# Patient Record
Sex: FEMALE | Race: WHITE | URBAN - METROPOLITAN AREA
[De-identification: names, ages, dates, MRNs, and addresses within clinical notes are randomized per-mention and may not be internally consistent; named-entity substitution may affect disease eponyms.]

---

## 2023-09-17 ENCOUNTER — WEB ENCOUNTER (OUTPATIENT)
Dept: URBAN - METROPOLITAN AREA CLINIC 23 | Facility: CLINIC | Age: 58
End: 2023-09-17

## 2023-09-18 ENCOUNTER — OFFICE VISIT (OUTPATIENT)
Dept: URBAN - METROPOLITAN AREA CLINIC 23 | Facility: CLINIC | Age: 58
End: 2023-09-18
Payer: COMMERCIAL

## 2023-09-18 ENCOUNTER — DASHBOARD ENCOUNTERS (OUTPATIENT)
Age: 58
End: 2023-09-18

## 2023-09-18 ENCOUNTER — WEB ENCOUNTER (OUTPATIENT)
Dept: URBAN - METROPOLITAN AREA CLINIC 23 | Facility: CLINIC | Age: 58
End: 2023-09-18

## 2023-09-18 VITALS
HEART RATE: 77 BPM | SYSTOLIC BLOOD PRESSURE: 177 MMHG | BODY MASS INDEX: 34.16 KG/M2 | HEIGHT: 65 IN | DIASTOLIC BLOOD PRESSURE: 92 MMHG | WEIGHT: 205 LBS | TEMPERATURE: 98.1 F

## 2023-09-18 DIAGNOSIS — E66.9 OBESITY (BMI 30.0-34.9): ICD-10-CM

## 2023-09-18 DIAGNOSIS — R11.0 NAUSEA: ICD-10-CM

## 2023-09-18 DIAGNOSIS — R10.11 RUQ ABDOMINAL PAIN: ICD-10-CM

## 2023-09-18 DIAGNOSIS — R10.10 UPPER ABDOMINAL PAIN: ICD-10-CM

## 2023-09-18 PROCEDURE — 99204 OFFICE O/P NEW MOD 45 MIN: CPT | Performed by: INTERNAL MEDICINE

## 2023-09-18 PROCEDURE — 99244 OFF/OP CNSLTJ NEW/EST MOD 40: CPT | Performed by: INTERNAL MEDICINE

## 2023-09-18 RX ORDER — LEVOTHYROXINE SODIUM 100 UG/1
TABLET ORAL
Qty: 0 | Refills: 0 | Status: ACTIVE | COMMUNITY
Start: 1900-01-01

## 2023-09-18 NOTE — HPI-TODAY'S VISIT:
- 59 yo  female, referred by Dr. Kathie Merino for further evaluation of GI complaints as detailed below.  A copy of this note will be sent to the referring physician. - For the past 1 year she has been experiencing intermittent RUQ abdominal pain.  These symptoms are getting worse.  She describes a constant dull upper abdominal pain which worsens about 30 minutes after meals and becomes sharp.  The pain may radiate to her epigastrium and to her right upper back in horseshoe fashion.  The pain ranges from 3-10/10 in intensity.  The pain tends to last 1-2 hours. - Associated symptoms include intermittent nausea without vomiting  - Had negative EGD done in 2017 by Dr. Strickland, for similar symptoms - Denies heartburn symptoms or history of chronic GERD - Patient had a colonoscopy with Dr. Strickland in 2017, which was negative for adenomatous colon polyps.  A repeat colonoscopy in 10 years was recommended.  - Denies hematochezia, change in bowel habits, or weight loss - Denies family history of GI malignancies in any first degree relatives

## 2023-11-14 ENCOUNTER — TELEPHONE ENCOUNTER (OUTPATIENT)
Dept: URBAN - METROPOLITAN AREA CLINIC 6 | Facility: CLINIC | Age: 58
End: 2023-11-14

## 2023-11-14 RX ORDER — METHYLPREDNISOLONE 32 MG/1
1 TABLET, TAKEN 12 HOURS PRIOR TO SCHEDULED CT SCAN AND AGAIN 2 HOURS PRIOR TO SCHEDULED CT SCAN TABLET ORAL
Qty: 2 | Refills: 0 | OUTPATIENT
Start: 2023-11-29 | End: 2023-12-01

## 2023-11-14 RX ORDER — DICYCLOMINE HYDROCHLORIDE 20 MG/1
1 TABLET TABLET ORAL
Qty: 90 | Refills: 3 | OUTPATIENT
Start: 2023-11-27 | End: 2024-11-21

## 2023-12-14 ENCOUNTER — LAB OUTSIDE AN ENCOUNTER (OUTPATIENT)
Dept: URBAN - METROPOLITAN AREA CLINIC 23 | Facility: CLINIC | Age: 58
End: 2023-12-14

## 2023-12-14 LAB
CREATININE POC: 0.5
PERFORMING LAB: (no result)

## 2023-12-20 ENCOUNTER — TELEPHONE ENCOUNTER (OUTPATIENT)
Dept: URBAN - METROPOLITAN AREA CLINIC 23 | Facility: CLINIC | Age: 58
End: 2023-12-20

## 2023-12-21 PROBLEM — 90708001: Status: ACTIVE | Noted: 2023-12-21

## 2023-12-21 PROBLEM — 274536007: Status: ACTIVE | Noted: 2023-12-21

## 2025-05-06 ENCOUNTER — OFFICE VISIT (OUTPATIENT)
Dept: URBAN - METROPOLITAN AREA CLINIC 23 | Facility: CLINIC | Age: 60
End: 2025-05-06
Payer: COMMERCIAL

## 2025-05-06 ENCOUNTER — LAB OUTSIDE AN ENCOUNTER (OUTPATIENT)
Dept: URBAN - METROPOLITAN AREA CLINIC 23 | Facility: CLINIC | Age: 60
End: 2025-05-06

## 2025-05-06 DIAGNOSIS — R94.5 ABNORMAL LFTS: ICD-10-CM

## 2025-05-06 DIAGNOSIS — R13.10 DYSPHAGIA: ICD-10-CM

## 2025-05-06 DIAGNOSIS — R10.11 RUQ ABDOMINAL PAIN: ICD-10-CM

## 2025-05-06 DIAGNOSIS — R11.0 NAUSEA: ICD-10-CM

## 2025-05-06 DIAGNOSIS — K21.9 CHRONIC GERD: ICD-10-CM

## 2025-05-06 PROBLEM — 235595009: Status: ACTIVE | Noted: 2025-05-06

## 2025-05-06 PROCEDURE — 99214 OFFICE O/P EST MOD 30 MIN: CPT | Performed by: INTERNAL MEDICINE

## 2025-05-06 RX ORDER — LEVOTHYROXINE SODIUM 100 UG/1
TABLET ORAL
Qty: 0 | Refills: 0 | Status: ACTIVE | COMMUNITY

## 2025-05-06 NOTE — HPI-TODAY'S VISIT:
60 yo  female, Presents with right upper quadrant pain, initially evaluated a year ago. Pain described as feeling like a "hot poker" in a specific area. Pain comes and goes without clear triggers. - Reports new symptoms: heartburn, indigestion, burning sensation after eating, feeling full quickly, occasional sensation of food getting stuck, and nausea. - Previous workup included HIDA scan consideration and neurology referral which found kidney cyst. - Attempted to identify triggers through diet modification (reducing fried foods, spicy foods) without success. - Recently recovered from pneumonia, treated with antibiotics and steroids, which worsened GI symptoms. - Currently taking probiotics (Florajen). - PMHx: Metabolic syndrome, insulin resistance, hypothyroidism, ulcer at young age, IBS. - Family history: Mother with esophageal issues and hiatal hernia, mother and grandmother with gastroparesis.  - Previous endoscopy in 2017 was normal. - Previous ultrasound showed enlarged liver without gallstones. - Recent liver enzyme elevation: AST increased from 11 to 22.  - Patient had a colonoscopy  in 2017, which was negative for adenomatous colon polyps.  A repeat colonoscopy in 10 years was recommended.  -  -

## 2025-05-07 ENCOUNTER — OFFICE VISIT (OUTPATIENT)
Dept: URBAN - METROPOLITAN AREA LAB 3 | Facility: LAB | Age: 60
End: 2025-05-07
Payer: COMMERCIAL

## 2025-05-07 ENCOUNTER — LAB OUTSIDE AN ENCOUNTER (OUTPATIENT)
Dept: URBAN - METROPOLITAN AREA CLINIC 23 | Facility: CLINIC | Age: 60
End: 2025-05-07

## 2025-05-07 ENCOUNTER — TELEPHONE ENCOUNTER (OUTPATIENT)
Dept: URBAN - METROPOLITAN AREA CLINIC 23 | Facility: CLINIC | Age: 60
End: 2025-05-07

## 2025-05-07 DIAGNOSIS — K31.7 BENIGN GASTRIC POLYP: ICD-10-CM

## 2025-05-07 DIAGNOSIS — K29.80 ACUTE DUODENITIS: ICD-10-CM

## 2025-05-07 DIAGNOSIS — K20.80 OTHER ESOPHAGITIS WITHOUT BLEEDING: ICD-10-CM

## 2025-05-07 DIAGNOSIS — K29.60 ADENOPAPILLOMATOSIS GASTRICA: ICD-10-CM

## 2025-05-07 PROBLEM — 235675006: Status: ACTIVE | Noted: 2025-05-07

## 2025-05-07 LAB
ALBUMIN/GLOBULIN RATIO: 1.9
ALBUMIN: 4.3
ALKALINE PHOSPHATASE: 48
ALT: 14
AST: 9
BILIRUBIN, DIRECT: 0.1
BILIRUBIN, INDIRECT: 0.4
BILIRUBIN, TOTAL: 0.5
FIB 4 INDEX: 0.59
FIB 4 INTERPRETATION: (no result)
GLOBULIN: 2.3
PLATELET COUNT: 244
PROTEIN, TOTAL: 6.6

## 2025-05-07 PROCEDURE — 43239 EGD BIOPSY SINGLE/MULTIPLE: CPT | Performed by: INTERNAL MEDICINE

## 2025-05-07 RX ORDER — PANTOPRAZOLE SODIUM 40 MG/1
1 TABLET TABLET, DELAYED RELEASE ORAL ONCE A DAY
Qty: 90 TABLET | Refills: 3 | OUTPATIENT
Start: 2025-05-07

## 2025-05-07 RX ORDER — LEVOTHYROXINE SODIUM 100 UG/1
TABLET ORAL
Qty: 0 | Refills: 0 | Status: ACTIVE | COMMUNITY

## 2025-05-09 ENCOUNTER — TELEPHONE ENCOUNTER (OUTPATIENT)
Dept: URBAN - METROPOLITAN AREA CLINIC 23 | Facility: CLINIC | Age: 60
End: 2025-05-09

## 2025-05-29 ENCOUNTER — OFFICE VISIT (OUTPATIENT)
Dept: URBAN - METROPOLITAN AREA CLINIC 23 | Facility: CLINIC | Age: 60
End: 2025-05-29

## 2025-05-29 ENCOUNTER — OFFICE VISIT (OUTPATIENT)
Dept: URBAN - METROPOLITAN AREA CLINIC 36 | Facility: CLINIC | Age: 60
End: 2025-05-29

## 2025-06-26 ENCOUNTER — WEB ENCOUNTER (OUTPATIENT)
Dept: URBAN - METROPOLITAN AREA CLINIC 23 | Facility: CLINIC | Age: 60
End: 2025-06-26